# Patient Record
Sex: FEMALE | Race: WHITE | NOT HISPANIC OR LATINO | Employment: FULL TIME | URBAN - METROPOLITAN AREA
[De-identification: names, ages, dates, MRNs, and addresses within clinical notes are randomized per-mention and may not be internally consistent; named-entity substitution may affect disease eponyms.]

---

## 2023-05-22 ENCOUNTER — OFFICE VISIT (OUTPATIENT)
Dept: PRIMARY CARE | Facility: CLINIC | Age: 28
End: 2023-05-22
Payer: COMMERCIAL

## 2023-05-22 VITALS
HEART RATE: 78 BPM | BODY MASS INDEX: 18.95 KG/M2 | TEMPERATURE: 98.1 F | SYSTOLIC BLOOD PRESSURE: 94 MMHG | DIASTOLIC BLOOD PRESSURE: 62 MMHG | WEIGHT: 107 LBS | OXYGEN SATURATION: 99 %

## 2023-05-22 DIAGNOSIS — L30.8 OTHER ECZEMA: ICD-10-CM

## 2023-05-22 DIAGNOSIS — F41.9 ANXIETY: Primary | ICD-10-CM

## 2023-05-22 PROCEDURE — 1036F TOBACCO NON-USER: CPT | Performed by: FAMILY MEDICINE

## 2023-05-22 PROCEDURE — 99213 OFFICE O/P EST LOW 20 MIN: CPT | Performed by: FAMILY MEDICINE

## 2023-05-22 RX ORDER — TRIAMCINOLONE ACETONIDE 5 MG/G
OINTMENT TOPICAL 2 TIMES DAILY
Qty: 15 G | Refills: 2 | Status: SHIPPED | OUTPATIENT
Start: 2023-05-22 | End: 2024-03-04 | Stop reason: ALTCHOICE

## 2023-05-22 RX ORDER — FLUOXETINE 10 MG/1
10 CAPSULE ORAL DAILY
COMMUNITY
Start: 2023-02-21 | End: 2023-05-22 | Stop reason: ALTCHOICE

## 2023-05-22 RX ORDER — FLUOXETINE 10 MG/1
CAPSULE ORAL
Qty: 90 CAPSULE | OUTPATIENT
Start: 2023-05-22

## 2023-05-22 RX ORDER — TRIAMCINOLONE ACETONIDE 1 MG/G
CREAM TOPICAL
COMMUNITY
Start: 2023-02-21

## 2023-05-22 RX ORDER — FLUOXETINE HYDROCHLORIDE 20 MG/1
20 CAPSULE ORAL DAILY
Qty: 90 CAPSULE | Refills: 0 | Status: SHIPPED | OUTPATIENT
Start: 2023-05-22 | End: 2023-08-22 | Stop reason: SDUPTHER

## 2023-05-22 NOTE — PROGRESS NOTES
Subjective   Patient ID: Ellie Castro is a 27 y.o. female who presents for Anxiety (Recheck).  HPI    1. anxiety: Doing better on fluoxetine.  Still some episodes of anxiety, but overall much better than before.    2. Ecsema: having ecsema outbreak on her elbows.  It is better than last time but still has some flareups.      Current Outpatient Medications on File Prior to Visit   Medication Sig Dispense Refill    FLUoxetine (PROzac) 10 mg capsule Take 1 capsule (10 mg) by mouth once daily.      triamcinolone (Kenalog) 0.1 % cream Apply topically.       No current facility-administered medications on file prior to visit.        Vitals:    05/22/23 1105   BP: 94/62   Pulse: 78   Temp: 36.7 °C (98.1 °F)   SpO2: 99%       Review of Systems   All other systems reviewed and are negative.      Objective     Physical Exam  Constitutional:       Appearance: Normal appearance. She is well-developed.   HENT:      Head: Atraumatic.   Cardiovascular:      Rate and Rhythm: Normal rate and regular rhythm.      Heart sounds: Normal heart sounds. No murmur heard.  Pulmonary:      Effort: Pulmonary effort is normal.      Breath sounds: Normal breath sounds.   Abdominal:      General: Bowel sounds are normal.      Palpations: Abdomen is soft.   Skin:     General: Skin is warm.   Neurological:      General: No focal deficit present.      Mental Status: She is alert.   Psychiatric:         Mood and Affect: Mood normal.         No visits with results within 2 Month(s) from this visit.   Latest known visit with results is:   Legacy Encounter on 12/13/2021   Component Date Value Ref Range Status    SARS-COV-2 RESULT 12/13/2021 NOT DETECTED  Not Detected Final    Comment: .  This assay is designed to detect the N, ORF1ab and/or S genes of SARS-CoV-2   via nucleic acid amplification.  A Negative (NOT DETECTED) result does not   preclude 2019-nCoV infection since the adequacy of sample collection and/or   low viral burden may result in  presence of viral nucleic acids below the   clinical sensitivity of this test method.  Negative (NOT DETECTED) result   should not be used as the sole basis for treatment or other patient   management decisions. Rather negative results should be combined with   clinical observations, patient history, and epidemiological information to   make patient management decisions.    Fact sheet for providers: https://www.fda.gov/media/656525/download  Fact sheet for patients: https://www.fda.gov/media/242887/download    This test has received FDA Emergency Use Authorization (EUA) and has been   verified by [facility] ([facility]). This test   is only authorized for the duration of time that circumstances                            exist to   justify the authorization of the emergency use of in vitro diagnostic tests   for the detection of SARS-CoV-2 virus and/or diagnosis of COVID-19 infection   under section 564(b)(1) of the Act, 21 U.S.C. 360bbb-3(b)(1), unless the   authorization is terminated or revoked sooner.      [facility] is certified under CLIA-88 as   qualified to perform high complexity testing. Testing is performed in the   [facility] laboratories located at [address] [place] [address].         Assessment/Plan   Problem List Items Addressed This Visit    None  Visit Diagnoses       Anxiety    -  Primary    Relevant Medications    FLUoxetine (PROzac) 20 mg capsule    Other eczema        Relevant Medications    triamcinolone (Kenalog) 0.5 % ointment          Patient is doing well.  Refilled pts meds.  Follow up in 3 mo.    Inc dosage to 20 mg

## 2023-06-02 ENCOUNTER — OFFICE VISIT (OUTPATIENT)
Dept: PRIMARY CARE | Facility: CLINIC | Age: 28
End: 2023-06-02
Payer: COMMERCIAL

## 2023-06-02 VITALS
TEMPERATURE: 97.2 F | HEART RATE: 90 BPM | DIASTOLIC BLOOD PRESSURE: 60 MMHG | OXYGEN SATURATION: 98 % | SYSTOLIC BLOOD PRESSURE: 100 MMHG

## 2023-06-02 DIAGNOSIS — R05.1 ACUTE COUGH: ICD-10-CM

## 2023-06-02 DIAGNOSIS — J01.90 ACUTE NON-RECURRENT SINUSITIS, UNSPECIFIED LOCATION: Primary | ICD-10-CM

## 2023-06-02 PROCEDURE — 99214 OFFICE O/P EST MOD 30 MIN: CPT | Performed by: PHYSICIAN ASSISTANT

## 2023-06-02 PROCEDURE — 1036F TOBACCO NON-USER: CPT | Performed by: PHYSICIAN ASSISTANT

## 2023-06-02 RX ORDER — AMOXICILLIN 500 MG/1
1000 CAPSULE ORAL 2 TIMES DAILY
Qty: 40 CAPSULE | Refills: 0 | Status: SHIPPED | OUTPATIENT
Start: 2023-06-02 | End: 2023-06-12

## 2023-06-02 RX ORDER — BENZONATATE 100 MG/1
100 CAPSULE ORAL 3 TIMES DAILY PRN
Qty: 42 CAPSULE | Refills: 0 | Status: SHIPPED | OUTPATIENT
Start: 2023-06-02 | End: 2023-07-02

## 2023-06-02 ASSESSMENT — ENCOUNTER SYMPTOMS
ABDOMINAL PAIN: 0
COUGH: 1
SHORTNESS OF BREATH: 0

## 2023-06-02 NOTE — PROGRESS NOTES
Subjective   Patient ID: Ellie Castro is a 27 y.o. female who presents for Cough (Chest congestion, ha,some fatigue x 1 wk).    Cough  Pertinent negatives include no chest pain or shortness of breath.      Patient c/o cough, nasal discharge or sinus pain. Denies fever, shortness of breath. The first day had a sore throat but that resolved.   Taking over the counter medications. Present for 8 days. Has worsened since onset.   Cough: Cough is productive of greenish sputum.   Nasal discharge: Described as purulent.   Sinus pain: Experiencing frontal pain.   Denies associated diarrhea, earache and vomiting.     Tired from not sleeping well since coughing at night.     Using Dayquil and benadryl.     Patient denies recent known COVID exposure or international travel.   Got COVID vaccine.        reports that she has never smoked. She has never used smokeless tobacco.      Review of Systems   Respiratory:  Positive for cough. Negative for shortness of breath.    Cardiovascular:  Negative for chest pain.   Gastrointestinal:  Negative for abdominal pain.       Objective   /60   Pulse 90   Temp 36.2 °C (97.2 °F)   SpO2 98%     Physical Exam  Constitutional:       General: She is not in acute distress.     Appearance: Normal appearance.   HENT:      Head: Normocephalic.      Right Ear: Tympanic membrane, ear canal and external ear normal.      Left Ear: Tympanic membrane, ear canal and external ear normal.      Nose: Rhinorrhea present.      Right Sinus: Maxillary sinus tenderness and frontal sinus tenderness present.      Left Sinus: Maxillary sinus tenderness and frontal sinus tenderness present.      Mouth/Throat:      Mouth: Mucous membranes are moist.      Pharynx: No oropharyngeal exudate or posterior oropharyngeal erythema.   Eyes:      General: No scleral icterus.  Cardiovascular:      Rate and Rhythm: Normal rate and regular rhythm.   Pulmonary:      Effort: Pulmonary effort is normal.      Breath  sounds: Normal breath sounds. No wheezing, rhonchi or rales.   Lymphadenopathy:      Cervical: No cervical adenopathy.   Neurological:      Mental Status: She is alert.         Assessment/Plan   Diagnoses and all orders for this visit:  Acute non-recurrent sinusitis, unspecified location  -     amoxicillin (Amoxil) 500 mg capsule; Take 2 capsules (1,000 mg) by mouth 2 times a day for 10 days.  Acute cough  -     benzonatate (Tessalon) 100 mg capsule; Take 1 capsule (100 mg) by mouth 3 times a day as needed for cough. Do not crush or chew.       Rx amoxicillin.   Rx Tessalon prn cough.   Can use Mucinex DM.   Encourage fluids and rest.   Follow up if symptoms increase or persist.

## 2023-08-12 DIAGNOSIS — F41.9 ANXIETY: ICD-10-CM

## 2023-08-14 RX ORDER — FLUOXETINE HYDROCHLORIDE 20 MG/1
20 CAPSULE ORAL DAILY
Qty: 90 CAPSULE | Refills: 0 | OUTPATIENT
Start: 2023-08-14

## 2023-08-22 ENCOUNTER — OFFICE VISIT (OUTPATIENT)
Dept: PRIMARY CARE | Facility: CLINIC | Age: 28
End: 2023-08-22
Payer: COMMERCIAL

## 2023-08-22 VITALS
SYSTOLIC BLOOD PRESSURE: 94 MMHG | DIASTOLIC BLOOD PRESSURE: 62 MMHG | WEIGHT: 103 LBS | BODY MASS INDEX: 18.25 KG/M2 | HEART RATE: 84 BPM | OXYGEN SATURATION: 99 % | TEMPERATURE: 97 F

## 2023-08-22 DIAGNOSIS — F41.9 ANXIETY: ICD-10-CM

## 2023-08-22 PROBLEM — G47.00 INSOMNIA: Status: ACTIVE | Noted: 2019-10-08

## 2023-08-22 PROBLEM — F41.1 ANXIETY STATE: Status: ACTIVE | Noted: 2019-10-08

## 2023-08-22 PROBLEM — F90.0 ATTENTION DEFICIT HYPERACTIVITY DISORDER, PREDOMINANTLY INATTENTIVE TYPE: Status: ACTIVE | Noted: 2020-10-13

## 2023-08-22 PROBLEM — F32.9 MAJOR DEPRESSIVE DISORDER, SINGLE EPISODE, UNSPECIFIED: Status: ACTIVE | Noted: 2019-10-08

## 2023-08-22 PROCEDURE — 1036F TOBACCO NON-USER: CPT | Performed by: FAMILY MEDICINE

## 2023-08-22 PROCEDURE — 99213 OFFICE O/P EST LOW 20 MIN: CPT | Performed by: FAMILY MEDICINE

## 2023-08-22 RX ORDER — FLUOXETINE HYDROCHLORIDE 20 MG/1
20 CAPSULE ORAL DAILY
Qty: 90 CAPSULE | Refills: 1 | Status: SHIPPED | OUTPATIENT
Start: 2023-08-22 | End: 2024-01-04 | Stop reason: SDUPTHER

## 2023-08-22 ASSESSMENT — ENCOUNTER SYMPTOMS: DEPRESSION: 1

## 2023-08-22 NOTE — PROGRESS NOTES
Subjective   Patient ID: Ellie Castro is a 27 y.o. female who presents for ADHD, Anxiety, and Depression.  ADHD    Anxiety        Depression    Anxiety: well controlled.  Interested possibly weaning off fluoxetine when she tries to get pregnant.  Mood is good.    Patient Active Problem List   Diagnosis    Anxiety state    Insomnia    Major depressive disorder, single episode, unspecified    Attention deficit hyperactivity disorder, predominantly inattentive type       Social Connections: Not on file       Current Outpatient Medications on File Prior to Visit   Medication Sig Dispense Refill    FLUoxetine (PROzac) 20 mg capsule Take 1 capsule (20 mg) by mouth once daily. 90 capsule 0    triamcinolone (Kenalog) 0.1 % cream Apply topically.      triamcinolone (Kenalog) 0.5 % ointment Apply topically 2 times a day. 15 g 2     No current facility-administered medications on file prior to visit.        Vitals:    08/22/23 0922   BP: 94/62   Pulse: 84   Temp: 36.1 °C (97 °F)   SpO2: 99%     Vitals:    08/22/23 0922   Weight: 46.7 kg (103 lb)       Review of Systems   Psychiatric/Behavioral:  Positive for depression.    All other systems reviewed and are negative.      Objective     Physical Exam  Constitutional:       Appearance: Normal appearance. She is well-developed.   HENT:      Head: Atraumatic.   Cardiovascular:      Rate and Rhythm: Normal rate and regular rhythm.      Heart sounds: Normal heart sounds. No murmur heard.  Pulmonary:      Effort: Pulmonary effort is normal.      Breath sounds: Normal breath sounds.   Abdominal:      General: Bowel sounds are normal.      Palpations: Abdomen is soft.   Skin:     General: Skin is warm.   Neurological:      General: No focal deficit present.      Mental Status: She is alert.   Psychiatric:         Mood and Affect: Mood normal.         No visits with results within 2 Month(s) from this visit.   Latest known visit with results is:   Legacy Encounter on 12/13/2021    Component Date Value Ref Range Status    SARS-CoV-2 Result 12/13/2021 NOT DETECTED  Not Detected Final    Comment: .  This assay is designed to detect the N, ORF1ab and/or S genes of SARS-CoV-2   via nucleic acid amplification.  A Negative (NOT DETECTED) result does not   preclude 2019-nCoV infection since the adequacy of sample collection and/or   low viral burden may result in presence of viral nucleic acids below the   clinical sensitivity of this test method.  Negative (NOT DETECTED) result   should not be used as the sole basis for treatment or other patient   management decisions. Rather negative results should be combined with   clinical observations, patient history, and epidemiological information to   make patient management decisions.    Fact sheet for providers: https://www.fda.gov/media/650267/download  Fact sheet for patients: https://www.fda.gov/media/544685/download    This test has received FDA Emergency Use Authorization (EUA) and has been   verified by [facility] ([facility]). This test   is only authorized for the duration of time that circumstances                            exist to   justify the authorization of the emergency use of in vitro diagnostic tests   for the detection of SARS-CoV-2 virus and/or diagnosis of COVID-19 infection   under section 564(b)(1) of the Act, 21 U.S.C. 360bbb-3(b)(1), unless the   authorization is terminated or revoked sooner.      [facility] is certified under CLIA-88 as   qualified to perform high complexity testing. Testing is performed in the   [facility] laboratories located at [address] [place] [address].         Assessment/Plan   Problem List Items Addressed This Visit    None  Visit Diagnoses       Anxiety              Patient is doing well.  Refilled pts meds.  Follow up in 6 mo.   OK to wean before pregnancy but doesn't have to.

## 2023-10-24 ENCOUNTER — HOSPITAL ENCOUNTER (OUTPATIENT)
Dept: RADIOLOGY | Facility: EXTERNAL LOCATION | Age: 28
Discharge: HOME | End: 2023-10-24
Payer: COMMERCIAL

## 2023-10-24 DIAGNOSIS — M79.645 PAIN IN FINGER OF LEFT HAND: ICD-10-CM

## 2024-01-02 RX ORDER — FLUOXETINE 10 MG/1
10 CAPSULE ORAL DAILY
Qty: 90 CAPSULE | OUTPATIENT
Start: 2024-01-02

## 2024-01-04 ENCOUNTER — TELEPHONE (OUTPATIENT)
Dept: PRIMARY CARE | Facility: CLINIC | Age: 29
End: 2024-01-04
Payer: COMMERCIAL

## 2024-01-04 DIAGNOSIS — F41.9 ANXIETY: ICD-10-CM

## 2024-01-04 RX ORDER — FLUOXETINE HYDROCHLORIDE 20 MG/1
20 CAPSULE ORAL DAILY
Qty: 90 CAPSULE | Refills: 1 | Status: SHIPPED | OUTPATIENT
Start: 2024-01-04 | End: 2024-03-04 | Stop reason: SDUPTHER

## 2024-01-04 NOTE — TELEPHONE ENCOUNTER
Patient went off of   FLUoxetine (PROzac) 20 mg capsule because she was trying to get pregnant and that has not happened. [facility] stated if she wanted to go back on it to call in. Please send to [facility].

## 2024-02-22 ENCOUNTER — APPOINTMENT (OUTPATIENT)
Dept: PRIMARY CARE | Facility: CLINIC | Age: 29
End: 2024-02-22
Payer: COMMERCIAL

## 2024-03-04 ENCOUNTER — OFFICE VISIT (OUTPATIENT)
Dept: PRIMARY CARE | Facility: CLINIC | Age: 29
End: 2024-03-04

## 2024-03-04 VITALS
BODY MASS INDEX: 19.84 KG/M2 | OXYGEN SATURATION: 99 % | SYSTOLIC BLOOD PRESSURE: 108 MMHG | TEMPERATURE: 98.3 F | WEIGHT: 112 LBS | HEART RATE: 92 BPM | DIASTOLIC BLOOD PRESSURE: 62 MMHG

## 2024-03-04 DIAGNOSIS — F41.9 ANXIETY: ICD-10-CM

## 2024-03-04 DIAGNOSIS — Z00.00 ROUTINE ADULT HEALTH MAINTENANCE: Primary | ICD-10-CM

## 2024-03-04 PROCEDURE — 99395 PREV VISIT EST AGE 18-39: CPT | Performed by: FAMILY MEDICINE

## 2024-03-04 PROCEDURE — 1036F TOBACCO NON-USER: CPT | Performed by: FAMILY MEDICINE

## 2024-03-04 RX ORDER — FLUOXETINE HYDROCHLORIDE 20 MG/1
20 CAPSULE ORAL DAILY
Qty: 90 CAPSULE | Refills: 1 | Status: SHIPPED | OUTPATIENT
Start: 2024-03-04 | End: 2024-08-31

## 2024-03-04 ASSESSMENT — ENCOUNTER SYMPTOMS: DEPRESSION: 1

## 2024-03-04 NOTE — PROGRESS NOTES
Subjective   Patient ID: Ellie Castro is a 28 y.o. female who presents for Anxiety (Recheck ).  ADHD    Anxiety        Depression    Anxiety: well controlled.  No complaints    Life is going well.  Work is good.  Daughter is being potty trained.  No issues with medications.   Not pregnant yet but trying.    Patient Active Problem List   Diagnosis    Anxiety state    Insomnia    Major depressive disorder, single episode, unspecified    Attention deficit hyperactivity disorder, predominantly inattentive type       Social Connections: Not on file       Current Outpatient Medications on File Prior to Visit   Medication Sig Dispense Refill    triamcinolone (Kenalog) 0.1 % cream Apply topically.      [DISCONTINUED] FLUoxetine (PROzac) 20 mg capsule Take 1 capsule (20 mg) by mouth once daily. 90 capsule 1    [DISCONTINUED] triamcinolone (Kenalog) 0.5 % ointment Apply topically 2 times a day. (Patient not taking: Reported on 3/4/2024) 15 g 2     No current facility-administered medications on file prior to visit.        Vitals:    03/04/24 1506   BP: 108/62   Pulse: 92   Temp: 36.8 °C (98.3 °F)   SpO2: 99%     Vitals:    03/04/24 1506   Weight: 50.8 kg (112 lb)       Review of Systems   All other systems reviewed and are negative.      Objective     Physical Exam  Constitutional:       Appearance: Normal appearance. She is well-developed.   HENT:      Head: Atraumatic.   Cardiovascular:      Rate and Rhythm: Normal rate and regular rhythm.      Heart sounds: Normal heart sounds. No murmur heard.  Pulmonary:      Effort: Pulmonary effort is normal.      Breath sounds: Normal breath sounds.   Abdominal:      General: Bowel sounds are normal.      Palpations: Abdomen is soft.   Skin:     General: Skin is warm.   Neurological:      General: No focal deficit present.      Mental Status: She is alert.   Psychiatric:         Mood and Affect: Mood normal.         No visits with results within 2 Month(s) from this visit.    Latest known visit with results is:   Legacy Encounter on 12/13/2021   Component Date Value Ref Range Status    SARS-CoV-2 Result 12/13/2021 NOT DETECTED  Not Detected Final    Comment: .  This assay is designed to detect the N, ORF1ab and/or S genes of SARS-CoV-2   via nucleic acid amplification.  A Negative (NOT DETECTED) result does not   preclude 2019-nCoV infection since the adequacy of sample collection and/or   low viral burden may result in presence of viral nucleic acids below the   clinical sensitivity of this test method.  Negative (NOT DETECTED) result   should not be used as the sole basis for treatment or other patient   management decisions. Rather negative results should be combined with   clinical observations, patient history, and epidemiological information to   make patient management decisions.    Fact sheet for providers: https://www.fda.gov/media/011038/download  Fact sheet for patients: https://www.fda.gov/media/473991/download    This test has received FDA Emergency Use Authorization (EUA) and has been   verified by [facility] ([facility]). This test   is only authorized for the duration of time that circumstances                            exist to   justify the authorization of the emergency use of in vitro diagnostic tests   for the detection of SARS-CoV-2 virus and/or diagnosis of COVID-19 infection   under section 564(b)(1) of the Act, 21 U.S.C. 360bbb-3(b)(1), unless the   authorization is terminated or revoked sooner.      [facility] is certified under CLIA-88 as   qualified to perform high complexity testing. Testing is performed in the   [facility] laboratories located at [address] [place] [address].         Assessment/Plan   Problem List Items Addressed This Visit    None  Visit Diagnoses       Routine adult health maintenance    -  Primary    Relevant Orders    Lipid Panel    Comprehensive Metabolic Panel     CBC and Auto Differential    TSH with reflex to Free T4 if abnormal    Anxiety        Relevant Medications    FLUoxetine (PROzac) 20 mg capsule    Other Relevant Orders    Lipid Panel    Comprehensive Metabolic Panel    CBC and Auto Differential    TSH with reflex to Free T4 if abnormal          Patient is doing well.  Refilled pts meds.  Follow up in 6 mo.

## 2024-09-04 ENCOUNTER — APPOINTMENT (OUTPATIENT)
Dept: PRIMARY CARE | Facility: CLINIC | Age: 29
End: 2024-09-04